# Patient Record
Sex: FEMALE | Race: WHITE | Employment: OTHER | URBAN - METROPOLITAN AREA
[De-identification: names, ages, dates, MRNs, and addresses within clinical notes are randomized per-mention and may not be internally consistent; named-entity substitution may affect disease eponyms.]

---

## 2020-06-30 ENCOUNTER — OFFICE VISIT (OUTPATIENT)
Dept: OBGYN CLINIC | Age: 65
End: 2020-06-30
Payer: COMMERCIAL

## 2020-06-30 VITALS
BODY MASS INDEX: 28.12 KG/M2 | WEIGHT: 175 LBS | SYSTOLIC BLOOD PRESSURE: 121 MMHG | DIASTOLIC BLOOD PRESSURE: 68 MMHG | HEIGHT: 66 IN | HEART RATE: 66 BPM

## 2020-06-30 PROCEDURE — 1036F TOBACCO NON-USER: CPT | Performed by: OBSTETRICS & GYNECOLOGY

## 2020-06-30 PROCEDURE — 99203 OFFICE O/P NEW LOW 30 MIN: CPT | Performed by: OBSTETRICS & GYNECOLOGY

## 2020-06-30 PROCEDURE — G8427 DOCREV CUR MEDS BY ELIG CLIN: HCPCS | Performed by: OBSTETRICS & GYNECOLOGY

## 2020-06-30 PROCEDURE — G8419 CALC BMI OUT NRM PARAM NOF/U: HCPCS | Performed by: OBSTETRICS & GYNECOLOGY

## 2020-06-30 PROCEDURE — 3017F COLORECTAL CA SCREEN DOC REV: CPT | Performed by: OBSTETRICS & GYNECOLOGY

## 2023-03-09 NOTE — PROGRESS NOTES
Patient : Hailee Suarez Age: 75 year old Sex: female   MRN: [id number] Encounter Date: 3/9/2023    3A/B03A     LKWT: 11:30 AM today    History     Chief Complaint   Patient presents with   • Stroke Alert       HPI     Pt is Estonian speaking. History was obtained with the assistance of a family member, at the patient's request.      Hailee Suarez is a 75 year old presenting to the emergency department for evaluation of left sided weakness that began about one hour PTA.  At about 11:30 this morning, the pt began to feel left sided facial numbness, left sided weakness, left sided tingling, and dizziness. Currently, her sx are improving, but still present. The pt initially thought her sx were consistent with hypoglycemia and drank juice. When they did not improve, she came to the ED. The pt was being seen for abd pain that has been ongoing for about one week and was dropping off a stool sample for testing. She states that she had two bowel movements this morning with diarrhea and associated rectal pain described as \"burning\". There are no further complaints or modifying factors at this time.     I have reviewed Hailee Suarez's previous office visit note from GI on 3/7/2023.  Note Review Summary: They planned for an EGD, colonoscopy, and repeat imaging in two months.    Allergies   Allergen Reactions   • Cat Dander Runny Nose   • Unknown Other (See Comments)     beer       No current facility-administered medications for this encounter.     Current Outpatient Medications   Medication Sig   • traMADol (ULTRAM) 50 MG tablet Take 1 tablet by mouth every 8 hours as needed for Pain.   • atorvastatin (LIPITOR) 10 MG tablet Take 1 tablet by mouth daily.   • dicyclomine (BENTYL) 20 MG tablet Take 1 tablet by mouth 4 times daily as needed (abdominal cramping).   • omeprazole (PriLOSEC) 40 MG capsule Take 1 capsule by mouth 2 times daily.   • sucralfate (CARAFATE) 1 g tablet Take 1 tablet by mouth 4 times daily.   • gabapentin  Yudy Cordova  2020                         Primary Care Physician: No primary care provider on file. CC:   Chief Complaint   Patient presents with    Establish Care         HPI: Yudy Cordova is a 60 y.o. female  No LMP recorded. Patient has had a hysterectomy. The patient was seen and examined. She is here to establish care with a GYN. She reports that she lives in [place] for half of the year and then spends the other six months in [address] [place] [address]. She presents today to discuss her hormone replacement therapy. Patient states that she is s/p TAHBSO due to fibroid uterus in late . Since then, she was on estradiol tablets until February of this year. She decided that she didn't want to take the pills anymore and had estrogen pellets inserted while in [place] on 20. She was unable to follow up there due to [place]. She denies any menopausal vasomotor symptoms or any vaginal dryness/atrophy. Her bowel habits are regular. She denies any bloating. She denies dysuria. She denies urinary leaking. She denies vaginal discharge. She is sexually active with a male partner.     Depression Screen: Symptoms of decreased mood absent  Symptoms of anhedonia absent  **If either question is answered in a  positive fashion then complete the PHQ9 Scoring Evaluation and make the appropriate referral**    REVIEW OF SYSTEMS:  Constitutional: negative fever, negative chills  HEENT: negative visual disturbances, negative headaches  Respiratory: negative dyspnea, negative cough  Cardiovascular: negative chest pain,  negative palpitations  Gastrointestinal: negative abdominal pain, negative RUQ pain, negative N/V, negative diarrhea, negative constipation  Genitourinary: negative dysuria, negative vaginal discharge  Dermatological: negative rash  Hematologic: negative bruising  Immunologic/Lymphatic: negative recent illness, negative recent sick contact  Musculoskeletal: negative back pain, negative myalgias, negative (Neurontin) 600 MG tablet TAKE 2 TABLET BY MOUTH TWICE DAILY   • levETIRAcetam (KepPRA) 500 MG tablet TAKE 1 TABLET BY MOUTH EVERY 12 HOURS   • donepezil (ARICEPT) 5 MG tablet Take 1 tablet by mouth nightly.   • albuterol 108 (90 Base) MCG/ACT inhaler Inhale 2 puffs into the lungs every 4 hours as needed for Shortness of Breath or Wheezing.   • fluticasone-vilanterol (Breo Ellipta) 200-25 MCG/INH inhaler Inhale 1 puff into the lungs daily.   • fluticasone (FLONASE) 50 MCG/ACT nasal spray Spray 1 spray in each nostril daily.   • cetirizine (ZyrTEC) 10 MG tablet Take 1 tablet by mouth daily.   • meclizine (ANTIVERT) 25 MG tablet Take 1 tablet by mouth 3 times daily as needed for Dizziness.   • acetaminophen (TYLENOL) 325 MG tablet Take 2 tablets by mouth every 6 hours as needed for Pain or Fever.       Past Medical History:   Diagnosis Date   • Abdominal pain, periumbilical 2010   • Benign essential HTN     patient denies   • Bile leak, postoperative    • Chronic obstructive pulmonary disease, unspecified COPD type (CMD)     emphysema, pulmonary fibrosis   • Controlled type 2 diabetes mellitus without complication, without long-term current use of insulin (CMD)    • Encounter to establish care - Dr. VELA - 3/11/2022 3/11/2022   • Esophageal reflux    • Fibromyalgia    • Fluid collection at surgical site    •  3 para 3    • Heartburn 2010   • Hemorrhoids, internal 10/2/2009   • Melanosis coli 10/28/2021   • Mixed hyperlipidemia    • MAXINE (obstructive sleep apnea)    • Right hemiparesis (CMD)    • Seizure disorder (CMD)    • TBI (traumatic brain injury)        Past Surgical History:   Procedure Laterality Date   • Carpal tunnel release Bilateral    •  section, classic     • Colonoscopy diagnostic  10/2021   • Esophageal dilation  2021   • Gastric bypass  2020    [place]   • Knee arthroplasty Right    • Laparoscopic exploration abdomen  2020    anastomotic leak   • Laparoscopy,  Pleasant. Skin: Skin color, texture, turgor normal. No rashes or lesions. HEENT: normocephalic and atraumatic   Respiratory: Normal expansion. Clear to auscultation. No rales, rhonchi, or wheezing. Cardiovascular: normal rate and normal S1 and S2  Breast:  Deferred to annual  Abdomen: soft, non-tender, non-distended, no right upper quadrant tenderness and no CVA tenderness  Pelvic Exam: deferred to annual   Musculoskeletal: no gross abnormalities  Extremities: non-tender BLE and non-edematous  Psych:  oriented to time, place and person, mood and affect are within normal limits          ASSESSMENT & PLAN:    Jose Mancini is a 60 y.o. female  No LMP recorded. Patient has had a hysterectomy. - Reviewed risks/benefits/alternatives for HRT in a woman >59yo s/p hysterectomy with BSO. Counseled on management options, expectant, hormonal and non-hormonal. Patient vocalized understanding and states that she would like to see how she does off of hormonal therapy at this time. - Discussed Calcium and Vitamin D dosing.   - Screening mammogram ordered    There are no active problems to display for this patient. Return in about 7 months (around 2021) for annual or earlier prn. No Patient Care Coordination Note on file. Counseling Completed:    Discussed need for repeat pap as per American Society for Colposcopy and Cervical Pathology guidelines. Discussed need for mammograms every 1 year, If >42 yo and last mammogram was negative. Discussed need for colonoscopy screening as well as onset for bone density testing. Birth control and barrier recommendations reviewed. Discussed STD counseling and prevention. Hereditary Breast, Ovarian, Colon and Uterine Cancer screening done. Tobacco & Secondary smoke risks reviewed; with recommendation for cessation and avoidance. Routine health maintenance per patients PCP     Diagnosis Orders   1. Encounter to establish care     2.  S/P hysterectomy cholecystectomy  01/25/2022    Dr. Zendejas   • Removal gallbladder     • Total abdom hysterectomy         Family History   Problem Relation Age of Onset   • Dementia/Alzheimers Mother    • Cancer, Lung Mother    • Cancer, Prostate Father    • Alcohol Abuse Father    • Diabetes Brother    • Heart disease Maternal Grandmother    • Cancer Maternal Grandfather    • Heart disease Paternal Grandmother    • Cancer Paternal Grandfather    • Cancer, Prostate Maternal Uncle        Social History     Tobacco Use   • Smoking status: Former   • Smokeless tobacco: Never   Vaping Use   • Vaping Use: never used   Substance Use Topics   • Alcohol use: Never   • Drug use: Never       Review of Systems     Review of Systems   Constitutional: Negative for activity change, chills and fever.   HENT: Negative for congestion, ear pain and rhinorrhea.    Eyes: Negative for visual disturbance.   Respiratory: Negative for cough and shortness of breath.    Cardiovascular: Negative for chest pain.   Gastrointestinal: Positive for diarrhea and rectal pain. Negative for abdominal pain and vomiting.   Endocrine: Negative for polyuria.   Genitourinary: Negative for dysuria, frequency and vaginal discharge.   Musculoskeletal: Negative for myalgias.   Skin: Negative for rash and wound.   Allergic/Immunologic: Negative for immunocompromised state.   Neurological: Positive for dizziness, weakness (left sided) and numbness (Positive for left sided numbness and tingling in face, tongue, LLE, and LUE). Negative for speech difficulty and headaches.   Psychiatric/Behavioral: Negative for confusion.       Physical Exam     ED Triage Vitals [03/09/23 1300]   ED Triage Vitals Group      Temp 97.9 °F (36.6 °C)      Heart Rate (!) 52      Resp 20      /65      SpO2 98 %      EtCO2 mmHg       Height       Weight 164 lb 3.9 oz (74.5 kg)      Weight Scale Used Scale in bed      BMI (Calculated)       IBW/kg (Calculated)        Physical Exam  Vitals and  3. Encounter for screening mammogram for malignant neoplasm of breast  JAKY DIGITAL SCREEN W OR WO CAD BILATERAL          See-Angela Aponte, DO [facility]  6/30/2020 11:11 AM nursing note reviewed.   Constitutional:       General: She is not in acute distress.     Appearance: She is well-developed.   HENT:      Head: Normocephalic and atraumatic.      Mouth/Throat:      Pharynx: No oropharyngeal exudate.   Eyes:      General:         Right eye: No discharge.         Left eye: No discharge.      Conjunctiva/sclera: Conjunctivae normal.      Pupils: Pupils are equal, round, and reactive to light.   Cardiovascular:      Rate and Rhythm: Normal rate and regular rhythm.      Heart sounds: Normal heart sounds. No murmur heard.    No friction rub.   Pulmonary:      Effort: Pulmonary effort is normal. No respiratory distress.      Breath sounds: Normal breath sounds. No wheezing or rales.   Abdominal:      General: Bowel sounds are normal. There is no distension.      Palpations: Abdomen is soft.      Tenderness: There is no abdominal tenderness. There is no guarding or rebound.   Musculoskeletal:      Cervical back: Normal range of motion and neck supple.   Lymphadenopathy:      Cervical: No cervical adenopathy.   Skin:     General: Skin is warm and dry.      Findings: No rash.   Neurological:      Mental Status: She is alert and oriented to person, place, and time.      GCS: GCS eye subscore is 4. GCS verbal subscore is 5. GCS motor subscore is 6.      Cranial Nerves: No cranial nerve deficit, dysarthria (normal speech, no aphasia) or facial asymmetry.      Motor: No pronator drift.      Coordination: Finger-Nose-Finger Test normal.   Psychiatric:         Behavior: Behavior normal.         Thought Content: Thought content normal.         Judgment: Judgment normal.           Procedures     Procedures    Lab Results     Results for orders placed or performed during the hospital encounter of 03/09/23   Comprehensive Metabolic Panel   Result Value Ref Range    Fasting Status      Sodium 141 135 - 145 mmol/L    Potassium 4.3 3.4 - 5.1 mmol/L    Chloride 109 97 - 110 mmol/L    Carbon Dioxide 29 21 -  32 mmol/L    Anion Gap 7 7 - 19 mmol/L    Glucose 79 70 - 99 mg/dL    BUN 11 6 - 20 mg/dL    Creatinine 0.61 0.51 - 0.95 mg/dL    Glomerular Filtration Rate >90 >=60    BUN/ Creatinine Ratio 18 7 - 25    Calcium 9.2 8.4 - 10.2 mg/dL    Bilirubin, Total 0.3 0.2 - 1.0 mg/dL    GOT/AST 39 (H) <=37 Units/L    GPT/ALT 55 <64 Units/L    Alkaline Phosphatase 118 (H) 45 - 117 Units/L    Albumin 3.8 3.6 - 5.1 g/dL    Protein, Total 7.2 6.4 - 8.2 g/dL    Globulin 3.4 2.0 - 4.0 g/dL    A/G Ratio 1.1 1.0 - 2.4   Prothrombin Time   Result Value Ref Range    Prothrombin Time 10.9 9.7 - 11.8 sec    INR 1.1     Partial Thromboplastin Time   Result Value Ref Range    PTT 27 22 - 30 sec   Lipase   Result Value Ref Range    Lipase 177 73 - 393 Units/L   CBC with Automated Differential (performable only)   Result Value Ref Range    WBC 6.2 4.2 - 11.0 K/mcL    RBC 4.58 4.00 - 5.20 mil/mcL    HGB 12.9 12.0 - 15.5 g/dL    HCT 40.7 36.0 - 46.5 %    MCV 88.9 78.0 - 100.0 fl    MCH 28.2 26.0 - 34.0 pg    MCHC 31.7 (L) 32.0 - 36.5 g/dL    RDW-CV 13.3 11.0 - 15.0 %    RDW-SD 43.3 39.0 - 50.0 fL     140 - 450 K/mcL    NRBC 0 <=0 /100 WBC    Neutrophil, Percent 47 %    Lymphocytes, Percent 39 %    Mono, Percent 10 %    Eosinophils, Percent 3 %    Basophils, Percent 1 %    Immature Granulocytes 0 %    Absolute Neutrophils 2.9 1.8 - 7.7 K/mcL    Absolute Lymphocytes 2.4 1.0 - 4.0 K/mcL    Absolute Monocytes 0.6 0.3 - 0.9 K/mcL    Absolute Eosinophils  0.2 0.0 - 0.5 K/mcL    Absolute Basophils 0.0 0.0 - 0.3 K/mcL    Absolute Immature Granulocytes 0.0 0.0 - 0.2 K/mcL   Bilirubin, Direct   Result Value Ref Range    Bilirubin, Direct <0.1 0.0 - 0.2 mg/dL   TROPONIN I  POINT OF CARE   Result Value Ref Range    TROPONIN I - POINT OF CARE <0.10 <0.10 ng/mL       EKG     [facility] EKG report   Results for orders placed or performed during the hospital encounter of 03/09/23   Electrocardiogram 12-Lead   Result Value Ref Range    Systolic Blood Pressure  120     Diastolic Blood Pressure 66     Ventricular Rate EKG/Min (BPM) 51     Atrial Rate (BPM) 51     OH-Interval (MSEC) 194     QRS-Interval (MSEC) 82     QT-Interval (MSEC) 444     QTc 409     P Axis (Degrees) 49     R Axis (Degrees) 63     T Axis (Degrees) -3     REPORT TEXT       Sinus bradycardia  Nonspecific T wave abnormality  Abnormal ECG  When compared with ECG of  31-JUL-2022 22:23,  No significant change was found  Confirmed by MD DIMAS, ESTHELA SOTO ([id number]),  Brittany Davies ([id number]) on 3/9/2023 1:03:20 PM         Radiology Results     Imaging Results          CT Head Level 1 (Final result)  Result time 03/09/23 13:25:45    Final result                 Impression:    IMPRESSION:      No acute intracranial abnormality.    Chronic paranasal sinus disease.    Findings were called immediately to ESTHELA COCHRAN MD on 3/9/2023 1:14 PM by  Brittni To MD.      I, Attending Radiologist Ye Fritz MD, have reviewed the images and  report and concur with these findings interpreted by Resident Radiologist,  Brittni To MD.              Narrative:    CT HEAD LEVEL 1    CLINICAL INFORMATION:  Acute Stroke Alert presenting with Neuro deficit,  acute, stroke suspected, left sided numbness.    COMPARISON:  CT head on 2/6/2022.    TECHNIQUE:  Routine noncontrast head CT spanning cranial vertex through  foramen magnum.  Coronal and sagittal reformats were generated and  reviewed.    FINDINGS:    No acute intracranial hemorrhage.No evidence of hyperdense intravascular  thrombosis.    No evidence of regional hypodensity to suggest an evolving vascular insult  in a major vascular territory.  MRI is more sensitive for detection of  acute ischemia.    No pathologic extra-axial fluid collection, mass effect, or midline shift  identified.  Mild age-appropriate prominence of the ventricles and sulci.   Minimal patchy supratentorial white matter hypoattenuation is nonspecific  but typically ascribed to chronic  microvascular ischemic changes in a  patient of this age.     Moderate mucosal thickening in the left maxillary sinus with polypoid  thickening/retention formation on the left. There is fluid within the  sphenoid sinuses and scattered areas of mucosal thickening in the ethmoid  air cells. The mastoid air cells are clear. Bilateral ocular lens  replacements. Surgical changes of left frontal craniotomy are again seen.  Nonsurgical portions of the calvarium are intact..                    Preliminary result                 Impression:          No acute intracranial abnormality.     Findings were called immediately to ESTHELA COCHRAN MD on 3/9/2023 1:14 PM   by  Brittni To MD.               Narrative:    CT HEAD LEVEL 1    CLINICAL INFORMATION:  Acute Stroke Alert presenting with Neuro deficit,  acute, stroke suspected, left sided numbness.    COMPARISON:  CT head on 2/6/2022.    TECHNIQUE:  Routine noncontrast head CT spanning cranial vertex through  foramen magnum.  Coronal and sagittal reformats were generated and  reviewed.    FINDINGS:    No acute intracranial hemorrhage.No evidence of hyperdense intravascular  thrombosis.    No evidence of regional hypodensity to suggest an evolving vascular insult  in a major vascular territory.  MRI is more sensitive for detection of  acute ischemia.    No pathologic extra-axial fluid collection, mass effect, or midline shift  identified.  Mild age-appropriate prominence of the ventricles and sulci.   Minimal patchy supratentorial white matter hypoattenuation is nonspecific  but typically ascribed to chronic microvascular ischemic changes in a  patient of this age.     Moderate mucosal thickening in the left maxillary sinus with polypoid  thickening/retention formation on the left. There is fluid within the  sphenoid sinuses and scattered areas of mucosal thickening in the ethmoid  air cells. The mastoid air cells are clear. Bilateral ocular lens  replacements. Surgical changes  of left frontal craniotomy are again seen.  Nonsurgical portions of the calvarium are intact..                                  ED Medications     Medications   sodium chloride 0.9 % flush bag 25 mL (has no administration in time range)   sodium chloride (PF) 0.9 % injection 2 mL (has no administration in time range)   tenecteplase (TNKase) for STROKE kit 19 mg (19 mg Intravenous Not Given 3/9/23 1447)   morphine injection 4 mg (4 mg Intravenous Given 3/9/23 1509)         ED Course     Vitals:    03/09/23 1515 03/09/23 1530 03/09/23 1536 03/09/23 1600   BP: 110/68 112/65  122/90   BP Location:       Patient Position:       Pulse: (!) 55 (!) 52  75   Resp: 14 15 17 20   Temp:       TempSrc:       SpO2: 96% 97%  98%   Weight:           ED Course as of 03/09/23 1617   Thu Mar 09, 2023   1259 I have discussed the case with Dr. Isidro telestroke neurologist. We discussed the pertinent history, physical, diagnostic studies and the ED management of the patient. I stated that sx onset was 1.5 hours ago and her main sx is subjective numbness. She does not recommend TNK at this time. The plan is to evaluate the pt when she is back from CT. We agree on the plan. [ET]   1315 Radiology called with the results of the level 1 CT. There were no acute findings. [ET]   1315 Per stroke RN, the pt's NIH is a 1 and she passed the dysphagia screen. [ET]   1330 I rechecked the patient who is speaking with Dr. Russo. There is left leg weakness and Dr. Russo does not note any contraindications for TNK. She recommends ordering TNK and she will consult her team before authorizing administration. We agree on the plan of care. Pt updated by Dr. Isidro. [ET]   6584 I have discussed the case with Dr. Isidro, telestroke neurology. She states that the pt has a hx of brain bleeds and a hx of bleeding. In the past, she was told not to take to aspirin. At this time, TNK is not indicated and it will not be delivered. The plan is to continue  care in the hospital for stroke workup and metabolic workup. She recommends an MRI and CTA which are not emergent at this time. We agree on the plan of care. [ET]   1515 I have discussed the case with Dr. Melendrez, hospitalist. We discussed the pertinent history, physical, diagnostic studies and the ED management of the patient. I stated that the pt presented for a stroke workup. Additionally, the pt is following with GI for abd pain which she does endorse today. The plan is to continue care in the hospital for a CTA and MRI. He agrees to accept the pt. We agree on the plan of care. [ET]   1516 I rechecked the patient who is resting comfortably. We discussed the results of her laboratory workup which was negative for acute findings. I informed them of the plan to continue care in the hospital for a CTA and MRI. Pt understands and agrees with the plan. All questions addressed.   [ET]      ED Course User Index  [ET] Brittany Davies       Radiology Review: I have independently interpreted the CT Head and have found No acute disease.  I am awaiting on the final radiology read.        NIH Stroke Scale 1 (03/09/23 1245)    Assessment Interval  Initial   Level of Consciousness 0 Alert   LOC Questions 0 Answers both questions correctly   LOC Commands 0 Performs both tasks correctly   Best Gaze 0 Normal   Visual 0 No visual loss   Facial Palsy 0 Normal   Motor Arm-Left 0 No drift   Motor Arm-Right 0 No drift   Motor Leg-Left 0 No drift   Motor Leg-Right 0 No drift   Limb Ataxia 0 Absent   Sensory 1 Mild-to-moderate sensory loss   Best Language 0 No aphasia   Dysarthria 0 Normal articulation   Extinction and Inattention 0 No abnormality   NIHSS Score 1                Consults                ED Consults done in the ED course    MDM                            Left facial, left arm, left leg numbness today which began around 11:30 a.m..  No drift on my exam or for stroke nurse, however patient feels that the left leg is heavier.   Considerations made with the tele stroke neurologist about whether to give tPA, however given patient's bleeding risk due to past history of bleeding, and very minor symptoms, decision made not to give TNK.  The patient will require admission.  Patient will be admitted for further stroke workup, and patient and her son are also very interested in having more workup done for her abdominal pain which is been worsening over the last few weeks.    Does the Patient have sepsis: NO     Critical Care       Due to the presence of stroke my attendance to this patient required critical care time of 32 minutes, including assessment/reassessment, documentation, ordering and interpreting ancillary studies, discussion with ED staff and consultants, patient and their family, and excludes time spent on separately billable procedures.        Disposition       Clinical Impression and Diagnosis  3:18 PM     Diagnosis:   1. Numbness and tingling of left arm and leg    2. Left leg weakness    3. Chronic abdominal pain           Pt to be admitted to Dr. Maza     Condition on Admission: Stable            Admit 3/9/2023  3:15 PM  Telemetry Bed?: Yes  Admitting Physician: JEANETTE MAZA [[id number]]  Transferring Patient to? Only adjust for transfers between [facility] ([facility], [facility], [facility]). **PLEASE ONLY USE BUTTON OPTIONS**: [facility] [[id number]]  Is this a telephone or verbal order?: This is a telephone order from the admitting physician          I have reviewed the information recorded by the scribe for accuracy and agree with its contents.    ____________________________________________________________________    Brittany Davies acting as a scribe for Dr. Rebeca Duarte  Dictation # [id number]  Scribe: Brittany Duarte MD  03/09/23 2140